# Patient Record
Sex: FEMALE | Race: WHITE | NOT HISPANIC OR LATINO
[De-identification: names, ages, dates, MRNs, and addresses within clinical notes are randomized per-mention and may not be internally consistent; named-entity substitution may affect disease eponyms.]

---

## 2020-10-22 ENCOUNTER — RESULT REVIEW (OUTPATIENT)
Age: 20
End: 2020-10-22

## 2023-01-27 ENCOUNTER — NON-APPOINTMENT (OUTPATIENT)
Age: 23
End: 2023-01-27

## 2023-04-10 PROBLEM — Z00.00 ENCOUNTER FOR PREVENTIVE HEALTH EXAMINATION: Status: ACTIVE | Noted: 2023-04-10

## 2023-04-26 ENCOUNTER — APPOINTMENT (OUTPATIENT)
Age: 23
End: 2023-04-26
Payer: COMMERCIAL

## 2023-04-26 ENCOUNTER — NON-APPOINTMENT (OUTPATIENT)
Age: 23
End: 2023-04-26

## 2023-04-26 VITALS
WEIGHT: 159 LBS | SYSTOLIC BLOOD PRESSURE: 144 MMHG | HEART RATE: 102 BPM | DIASTOLIC BLOOD PRESSURE: 81 MMHG | HEIGHT: 66 IN | OXYGEN SATURATION: 100 % | TEMPERATURE: 98.3 F | BODY MASS INDEX: 25.55 KG/M2

## 2023-04-26 DIAGNOSIS — G43.109 MIGRAINE WITH AURA, NOT INTRACTABLE, W/OUT STATUS MIGRAINOSUS: ICD-10-CM

## 2023-04-26 DIAGNOSIS — G44.89 OTHER HEADACHE SYNDROME: ICD-10-CM

## 2023-04-26 PROCEDURE — 99205 OFFICE O/P NEW HI 60 MIN: CPT

## 2023-04-26 RX ORDER — RIMEGEPANT SULFATE 75 MG/75MG
75 TABLET, ORALLY DISINTEGRATING ORAL
Qty: 10 | Refills: 2 | Status: ACTIVE | COMMUNITY
Start: 2023-04-26 | End: 1900-01-01

## 2023-04-26 NOTE — PHYSICAL EXAM
[FreeTextEntry1] : General: this is a pleasant patient in no acute distress\par \par HEENT conjunctiva are normal, no tenderness in head\par \par CV: normal pulses, regular rate and rhythm, no peripheral edema noted\par \par Lungs: breathing is non-labored\par \par abd: soft and non-distended\par \par MSK: L paraspinals tender.  also point tenderness at an area of the scalp\par SLR: \par GISELL:\par range of motion:\par tinnels: \par spurling:\par Occipital nerve tenderness: + on L\par \par Mental status:\par Alert and oriented to person, place and time, normal speech and comprehension\par \par Cranial Nerves:\par extra-occular movements in tact without nystagmus, normal saccades and smooth pursuit, Face symmetric and facial strength symmetric, facial sensation symmetric, \par \par Motor: normal bulk and tone throughout. no abnormal movements.  Full 5/5 strength uppers and lower extremities proximally and distally\par \par Sensory: in tact and symmetric to vibration, light tough, temperature\par \par Cerebellar: normal finger-nose-finger bilaterally\par \par Reflexes: 2+ in the upper and lower extremities and symmetric.  toes are bilaterally downgoing.\par \par Gait: stable, able to tip toe heel and tandem\par \par Stances:\par Romberg: normal\par \par

## 2023-04-26 NOTE — HISTORY OF PRESENT ILLNESS
[FreeTextEntry1] : HAILEY GOLDBERG is a 22 year who presents with headaches\par \par New headaches since early 2022. Localized sharp pains for 15 min at a time. Pain occurs variably.  At least once week and sometimes will cluster a few times in a day. Points to L parietal scalp about 2 inch area. Moving hair causes scalp tenderness. No migrainous or autonomic features.  Denies any identifiable inciting cause. \par \par Long hx of migraines, once with aura. Once ordered for MRI brain but never got it. With migraines, + photophobia not phonophobia, nausea, vomits 50% of the time, often has nausea vomiting precede the headache. Throbbing pains.  Severe pain.  Excedrin and edible marijuana help a lot sometimes but not always.  naproxen and ibuprofen don't help.  Typically better after going to sleep.  Can be all day.  Usually takes Exedrin quickly.  Not sure which triptan she has tried.\par \par Feels like eyes randomly darting back and forth a couple of times per week lasting a few seconds.  \par \par Had to switch from Adderall to vyvanse and getting bad headaches at night since then. taking 30mg daily.\par \par Hands and toes frequently tingling and numb. Trying magnesium didn't help. \par \par Denies diplopia, blurred vision, dysphagia, dysarthria, aphasia, focal weakness, bowel or bladder dysfunction, imbalance, falls.\par

## 2023-08-09 ENCOUNTER — APPOINTMENT (OUTPATIENT)
Dept: NEUROLOGY | Facility: CLINIC | Age: 23
End: 2023-08-09

## 2023-09-01 ENCOUNTER — APPOINTMENT (OUTPATIENT)
Dept: NEUROLOGY | Facility: CLINIC | Age: 23
End: 2023-09-01